# Patient Record
Sex: MALE | Race: BLACK OR AFRICAN AMERICAN | NOT HISPANIC OR LATINO | Employment: FULL TIME | URBAN - METROPOLITAN AREA
[De-identification: names, ages, dates, MRNs, and addresses within clinical notes are randomized per-mention and may not be internally consistent; named-entity substitution may affect disease eponyms.]

---

## 2019-04-17 PROBLEM — I45.6 WPW (WOLFF-PARKINSON-WHITE SYNDROME): Status: ACTIVE | Noted: 2019-04-17

## 2019-07-18 ENCOUNTER — TRANSCRIBE ORDERS (OUTPATIENT)
Dept: ADMINISTRATIVE | Facility: HOSPITAL | Age: 24
End: 2019-07-18

## 2019-07-18 DIAGNOSIS — R51.9 NONINTRACTABLE HEADACHE, UNSPECIFIED CHRONICITY PATTERN, UNSPECIFIED HEADACHE TYPE: Primary | ICD-10-CM

## 2019-07-29 ENCOUNTER — HOSPITAL ENCOUNTER (OUTPATIENT)
Dept: MRI IMAGING | Facility: HOSPITAL | Age: 24
Discharge: HOME | End: 2019-07-29
Admitting: NURSE PRACTITIONER

## 2019-07-29 DIAGNOSIS — R51.9 NONINTRACTABLE HEADACHE, UNSPECIFIED CHRONICITY PATTERN, UNSPECIFIED HEADACHE TYPE: ICD-10-CM

## 2019-07-29 PROCEDURE — 70544 MR ANGIOGRAPHY HEAD W/O DYE: CPT

## 2019-09-18 ENCOUNTER — CONSULT (OUTPATIENT)
Dept: CARDIOLOGY | Facility: CLINIC | Age: 24
End: 2019-09-18

## 2019-09-18 VITALS
SYSTOLIC BLOOD PRESSURE: 126 MMHG | HEIGHT: 71 IN | OXYGEN SATURATION: 95 % | HEART RATE: 72 BPM | DIASTOLIC BLOOD PRESSURE: 78 MMHG | BODY MASS INDEX: 29.26 KG/M2 | WEIGHT: 209 LBS

## 2019-09-18 DIAGNOSIS — I45.6 WPW (WOLFF-PARKINSON-WHITE SYNDROME): Primary | ICD-10-CM

## 2019-09-18 PROCEDURE — 93000 ELECTROCARDIOGRAM COMPLETE: CPT | Performed by: INTERNAL MEDICINE

## 2019-09-18 PROCEDURE — 99203 OFFICE O/P NEW LOW 30 MIN: CPT | Performed by: INTERNAL MEDICINE

## 2019-09-18 NOTE — PROGRESS NOTES
Electrophysiology Clinic Consult     Chandler Segal  1995  [unfilled]  [unfilled]    09/18/19    DATE OF ADMISSION: (Not on file)  [facility] CARDIOLOGY    Hamby, January, APRN  [address] [facility] [address] / [facility] [id number]    Chief Complaint   Patient presents with   • James-Parkinson-White Syndrome     Problem List:  1. WPW  a. Found on EKG prior to knee surgery, December 2009  b. No hx of tachycardia  2. Surgical history  a. Knee surgery      History of Present Illness:   Patient is a 23-year-old  male with above-noted past medical history who presents today in consultation by referral from January Hamby, APRN to reestablish care secondary to his history of WPW.  He was last seen by us in 2010.  He wishes to reestablish care as he is going to be coming off of his mother's insurance soon.  He has done well since we saw him last and denies any recent new complaints.  He has no history of SVT or symptoms of tachy-palpitations.  He is a non-smoker, 1 cup of coffee in the am, no energy drinks.  No thyroid problems.  Is active and denies any exertional complaints.  Works in sales.  On no medications at home.     No Known Allergies     Cannot display prior to admission medications because the patient has not been admitted in this contact.          No current outpatient medications on file.    Social History     Socioeconomic History   • Marital status:      Spouse name: Not on file   • Number of children: Not on file   • Years of education: Not on file   • Highest education level: Not on file   Tobacco Use   • Smoking status: Never Smoker   • Smokeless tobacco: Never Used   Substance and Sexual Activity   • Alcohol use: Yes     Comment: occas   • Drug use: No   • Sexual activity: Defer       Family History   Problem Relation Age of Onset   • No Known Problems Mother    • No Known Problems Father    • No Known Problems Sister    • No Known Problems Brother   "      REVIEW OF SYSTEMS:   CONST:  No weight loss, fever, chills, weakness or fatigue.   HEENT:  No visual loss, blurred vision, double vision, yellow sclerae.                   No hearing loss, congestion, sore throat.   SKIN:      No rashes, urticaria, ulcers, sores.     RESP:     No shortness of breath, hemoptysis, cough, sputum.   GI:           No anorexia, nausea, vomiting, diarrhea. No abdominal pain, melena.   :         No burning on urination, hematuria or increased frequency.  ENDO:    No diaphoresis, cold or heat intolerance. No polyuria or polydipsia.   NEURO:  No headache, dizziness, syncope, paralysis, ataxia, or parasthesias.                  No change in bowel or bladder control. No history of CVA/TIA  MUSC:    No muscle, back pain, joint pain or stiffness.   HEME:    No anemia, bleeding, bruising. No history of DVT/PE.  PSYCH:  No history of depression, anxiety    Vitals:    09/18/19 1454   BP: 126/78   BP Location: Right arm   Patient Position: Sitting   Pulse: 72   SpO2: 95%   Weight: 94.8 kg (209 lb)   Height: 180.3 cm (71\")                 Physical Exam:  GEN: Well nourished, well-developed, no acute distress  HEENT: Normocephalic, atraumatic, PERRLA, moist mucous membranes  NECK: Supple, NO JVD, no thyromegaly, no lymphadenopathy  CARD: S1S2, RRR, no murmur, gallop, rub, PMI NL  LUNGS: Clear to auscultation, normal respiratory effort  ABDOMEN: Soft, nontender, normal bowel sounds  EXTREMITIES: No gross deformities, no clubbing, cyanosis, or edema  SKIN: Warm, dry, no lesions  NEURO: No focal deficits, alert and oriented x 3  PSYCHIATRIC: Normal affect and mood      I personally viewed and interpreted the patient's EKG/Telemetry/lab data    No results found for: GLUCOSE, CALCIUM, NA, K, CO2, CL, BUN, CREATININE, EGFRIFAFRI, EGFRIFNONA, BCR, ANIONGAP  No results found for: WBC, HGB, HCT, MCV, PLT  No results found for: INR, PROTIME  No results found for: TSH, R1WQSDG, P2XGHUO, " THYROIDAB          ECG 12 Lead  Date/Time: 9/18/2019 3:05 PM  Performed by: Darek Church MD  Authorized by: Darek Church MD   Comparison: not compared with previous ECG   Previous ECG: no previous ECG available  Rhythm: sinus rhythm  BPM: 72                ICD-10-CM ICD-9-CM   1. WPW (James-Parkinson-White syndrome) I45.6 426.7       Assessment and Plan:   1. WPW:  - Patient with history of WPW diagnosed prior to his knee surgery in 2009.  He has had no history of SVT or symptoms of tachy-palpitations.  Clinically doing well with no new issues.  No evidence of WPW on today's EKG.  No changes to make today.  Instructed to call for any new onset tachy-palpitations, near syncope or syncope.     RTC 2 yrs    Scribed for Darek Church MD by Blanca Guerin, DWAYNE. 9/18/2019  3:05 PM     IDarek MD, personally performed the services described in this documentation as scribed by the above named individual in my presence, and it is both accurate and complete.  9/18/2019  3:46 PM

## 2022-01-20 LAB
ABO GROUP BLD: NORMAL
RH BLD: POSITIVE

## 2022-01-20 NOTE — PROGRESS NOTES
Please tell this patient's  that his blood type is O+ so his wife will need RhoGAM at 28 weeks.  Thank you.

## 2022-02-02 ENCOUNTER — TELEPHONE (OUTPATIENT)
Dept: OBSTETRICS AND GYNECOLOGY | Facility: CLINIC | Age: 27
End: 2022-02-02

## 2022-02-07 ENCOUNTER — TELEPHONE (OUTPATIENT)
Dept: OBSTETRICS AND GYNECOLOGY | Facility: CLINIC | Age: 27
End: 2022-02-07

## 2022-02-07 NOTE — TELEPHONE ENCOUNTER
[facility] is calling that they need the ICD code on the last labs done for this patient on 1/19. Please advise.    Thank you,  Petey

## 2022-07-20 ENCOUNTER — TELEPHONE (OUTPATIENT)
Dept: FAMILY MEDICINE CLINIC | Facility: CLINIC | Age: 27
End: 2022-07-20

## 2022-07-20 ENCOUNTER — OFFICE VISIT (OUTPATIENT)
Dept: FAMILY MEDICINE CLINIC | Facility: CLINIC | Age: 27
End: 2022-07-20

## 2022-07-20 VITALS
TEMPERATURE: 97.8 F | WEIGHT: 207 LBS | HEART RATE: 61 BPM | HEIGHT: 71 IN | BODY MASS INDEX: 28.98 KG/M2 | DIASTOLIC BLOOD PRESSURE: 75 MMHG | SYSTOLIC BLOOD PRESSURE: 130 MMHG

## 2022-07-20 DIAGNOSIS — R06.83 SNORING: ICD-10-CM

## 2022-07-20 DIAGNOSIS — Z00.00 ROUTINE GENERAL MEDICAL EXAMINATION AT A HEALTH CARE FACILITY: Primary | ICD-10-CM

## 2022-07-20 DIAGNOSIS — R53.83 FATIGUE, UNSPECIFIED TYPE: ICD-10-CM

## 2022-07-20 PROCEDURE — 99385 PREV VISIT NEW AGE 18-39: CPT | Performed by: NURSE PRACTITIONER

## 2022-07-20 NOTE — PROGRESS NOTES
"Chief Complaint  Establish Care (New pt, Physical )    Subjective        Chandler Segal presents to [facility] PRIMARY CARE  History of Present Illness     Chandler Segal is a 26-year-old male who presents today to establish care for physical.     The patient complains of frequent daytime fatigue. He reports feeling \"draggy\" upon waking up in the morning, which does improve throughout the day. He does snore and had previously been treated for sleep apnea with an oral appliance. However, he reports breaking the oral appliance and has since been using a bite guard for bruxism. He denies any jaw pain. The patient had a consultation with a sleep specialist last year but was lost to follow-up. He is not currently sleeping well through the night, as he has a 3-week-old son at home, but he reports his daytime fatigue began 6 months to 1 year prior to the birth of his son.     His job is stressful with long hours. Prior to the birth of his son, he had been exercising 3 days per week. He also admits he had been consuming a much healthier diet prior to the birth of his son.     The patient reports a history of James-Parkinson-White syndrome for which he follows with cardiology. He was last seen about 3 years ago where he had been told that he was doing well, and they would continue to monitor. He denies syncope, tachycardia, palpitations, or chest pain. He also denies obtaining any recent blood work. His blood pressure and heart rate are within normal limits today.     His current heatlh goals include becoming more physically fit and losing 5 to 10 pounds. The patient complains of upper back, neck, and shoulder pain and has been using a TENS unit per his chiropractor. He believes his back pain is due to a prior high school football injury. He previously had issues with tension headaches which have since resolved. He also notes that bruises seem to take longer to heal, which he attributes to aging. " "    The patient complains of occasional acid reflux triggered by his diet, usually when he eats pasta or pizza. He denies epigastric pain.      He denies any family history of breast cancer, ovarian cancer, colon cancer, or prostate cancer. He also denies a known family history of diabetes or heart disease.     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 He reports both alcohol consumption and marijuana use occasionally on weekends. He denies tobacco use.     Review of Systems   Constitutional: Positive for fatigue. Negative for appetite change and unexpected weight change.   HENT: Negative for congestion, ear pain, hearing loss, postnasal drip, rhinorrhea, sinus pressure, sore throat and trouble swallowing.         Positive for bruxism.    Eyes: Negative for visual disturbance.   Respiratory: Negative for apnea, cough, shortness of breath and wheezing.    Cardiovascular: Negative for chest pain, palpitations and leg swelling.   Gastrointestinal: Negative for abdominal pain, blood in stool, constipation, diarrhea, nausea and vomiting.        Positive for acid reflux   Endocrine: Negative for cold intolerance, heat intolerance, polydipsia, polyphagia and polyuria.   Genitourinary: Negative for difficulty urinating, hematuria, scrotal swelling and testicular pain.   Musculoskeletal: Positive for back pain (Upper back) and neck pain. Negative for arthralgias and joint swelling.   Skin: Negative for rash and wound.   Allergic/Immunologic: Negative for environmental allergies.   Neurological: Negative for dizziness and headaches.   Psychiatric/Behavioral: Positive for sleep disturbance. Negative for dysphoric mood. The patient is not nervous/anxious.        Objective   Vital Signs:  /75   Pulse 61   Temp 97.8 °F (36.6 °C)   Ht 180.3 cm (71\")   Wt 93.9 kg (207 lb)   BMI 28.87 kg/m²   Estimated body mass index is 28.87 kg/m² as calculated from the following:    Height as of this encounter: 180.3 cm (71\").   "  Weight as of this encounter: 93.9 kg (207 lb).    BMI is >= 25 and <30. (Overweight) The following options were offered after discussion;: weight loss educational material (shared in after visit summary)      Physical Exam  Vitals and nursing note reviewed.   Constitutional:       General: He is not in acute distress.     Appearance: He is well-developed. He is not ill-appearing.      Comments: Fit appearance, well dressed   HENT:      Head: Normocephalic and atraumatic.      Right Ear: Tympanic membrane, ear canal and external ear normal.      Left Ear: Tympanic membrane, ear canal and external ear normal.      Mouth/Throat:      Pharynx: Uvula midline.   Eyes:      General:         Right eye: No discharge.         Left eye: No discharge.      Conjunctiva/sclera: Conjunctivae normal.      Pupils: Pupils are equal, round, and reactive to light.   Neck:      Thyroid: No thyromegaly.   Cardiovascular:      Rate and Rhythm: Normal rate and regular rhythm.      Heart sounds: Normal heart sounds. No murmur heard.  Pulmonary:      Effort: Pulmonary effort is normal.      Breath sounds: Normal breath sounds.   Abdominal:      General: Bowel sounds are normal.      Palpations: Abdomen is soft.      Tenderness: There is no abdominal tenderness.   Musculoskeletal:         General: No deformity.      Cervical back: Neck supple.      Comments: Gait smooth and steady   Lymphadenopathy:      Cervical: No cervical adenopathy.   Skin:     General: Skin is warm and dry.   Neurological:      Mental Status: He is alert and oriented to person, place, and time.   Psychiatric:         Attention and Perception: Attention and perception normal.         Mood and Affect: Mood and affect normal.         Speech: Speech normal.         Behavior: Behavior normal. Behavior is cooperative.         Thought Content: Thought content normal.         Cognition and Memory: Cognition normal.      Comments: Very pleasant, conversant        Result Review  :                Assessment and Plan   Diagnoses and all orders for this visit:    1. Routine general medical examination at a health care facility (Primary)  -     CBC & Differential  -     Comprehensive Metabolic Panel  -     Lipid Panel With LDL / HDL Ratio  -     TSH Rfx On Abnormal To Free T4    2. Fatigue, unspecified type  -     CBC & Differential  -     Comprehensive Metabolic Panel  -     Lipid Panel With LDL / HDL Ratio  -     TSH Rfx On Abnormal To Free T4    3. Snoring      1. Health maintenance.  Appropriate health maintenance and prevention topics specific for this patient were discussed today.  Additionally, health goals, and health concerns addressed as appropriate.  Pt was encouraged to stay up to date on recommended screenings and vaccines based on USPSTF guidelines.     Routine fasting lab work will be obtained today. He can message me on frintit with any questions.      2. Fatigue.  He will research where he had previously been seen for sleep apnea, and we will request the records from them and determine next steps.        Follow Up   No follow-ups on file.  Patient was given instructions and counseling regarding his condition or for health maintenance advice. Please see specific information pulled into the AVS if appropriate.     Transcribed from ambient dictation for DWAYNE Hadley by DANIEL BERUMEN.  07/20/22   11:23 EDT    Patient verbalized consent to the visit recording.  I have personally performed the services described in this document as transcribed by the above individual, and it is both accurate and complete.  DWAYNE Hadley  7/20/2022  16:41 EDT

## 2022-07-21 LAB
ALBUMIN SERPL-MCNC: 4.9 G/DL (ref 4.1–5.2)
ALBUMIN/GLOB SERPL: 2 {RATIO} (ref 1.2–2.2)
ALP SERPL-CCNC: 86 IU/L (ref 44–121)
ALT SERPL-CCNC: 35 IU/L (ref 0–44)
AST SERPL-CCNC: 33 IU/L (ref 0–40)
BASOPHILS # BLD AUTO: 0 X10E3/UL (ref 0–0.2)
BASOPHILS NFR BLD AUTO: 1 %
BILIRUB SERPL-MCNC: 0.4 MG/DL (ref 0–1.2)
BUN SERPL-MCNC: 12 MG/DL (ref 6–20)
BUN/CREAT SERPL: 12 (ref 9–20)
CALCIUM SERPL-MCNC: 10.2 MG/DL (ref 8.7–10.2)
CHLORIDE SERPL-SCNC: 101 MMOL/L (ref 96–106)
CHOLEST SERPL-MCNC: 239 MG/DL (ref 100–199)
CO2 SERPL-SCNC: 23 MMOL/L (ref 20–29)
CREAT SERPL-MCNC: 1.01 MG/DL (ref 0.76–1.27)
EGFRCR SERPLBLD CKD-EPI 2021: 104 ML/MIN/1.73
EOSINOPHIL # BLD AUTO: 0.1 X10E3/UL (ref 0–0.4)
EOSINOPHIL NFR BLD AUTO: 1 %
ERYTHROCYTE [DISTWIDTH] IN BLOOD BY AUTOMATED COUNT: 12.1 % (ref 11.6–15.4)
GLOBULIN SER CALC-MCNC: 2.5 G/DL (ref 1.5–4.5)
GLUCOSE SERPL-MCNC: 97 MG/DL (ref 65–99)
HCT VFR BLD AUTO: 45 % (ref 37.5–51)
HDLC SERPL-MCNC: 46 MG/DL
HGB BLD-MCNC: 15.3 G/DL (ref 13–17.7)
IMM GRANULOCYTES # BLD AUTO: 0 X10E3/UL (ref 0–0.1)
IMM GRANULOCYTES NFR BLD AUTO: 0 %
LDLC SERPL CALC-MCNC: 150 MG/DL (ref 0–99)
LDLC/HDLC SERPL: 3.3 RATIO (ref 0–3.6)
LYMPHOCYTES # BLD AUTO: 2.2 X10E3/UL (ref 0.7–3.1)
LYMPHOCYTES NFR BLD AUTO: 38 %
MCH RBC QN AUTO: 29 PG (ref 26.6–33)
MCHC RBC AUTO-ENTMCNC: 34 G/DL (ref 31.5–35.7)
MCV RBC AUTO: 85 FL (ref 79–97)
MONOCYTES # BLD AUTO: 0.5 X10E3/UL (ref 0.1–0.9)
MONOCYTES NFR BLD AUTO: 9 %
NEUTROPHILS # BLD AUTO: 2.9 X10E3/UL (ref 1.4–7)
NEUTROPHILS NFR BLD AUTO: 51 %
PLATELET # BLD AUTO: 297 X10E3/UL (ref 150–450)
POTASSIUM SERPL-SCNC: 4.6 MMOL/L (ref 3.5–5.2)
PROT SERPL-MCNC: 7.4 G/DL (ref 6–8.5)
RBC # BLD AUTO: 5.28 X10E6/UL (ref 4.14–5.8)
SODIUM SERPL-SCNC: 139 MMOL/L (ref 134–144)
TRIGL SERPL-MCNC: 236 MG/DL (ref 0–149)
TSH SERPL DL<=0.005 MIU/L-ACNC: 0.81 UIU/ML (ref 0.45–4.5)
VLDLC SERPL CALC-MCNC: 43 MG/DL (ref 5–40)
WBC # BLD AUTO: 5.8 X10E3/UL (ref 3.4–10.8)

## 2022-07-26 ENCOUNTER — TELEPHONE (OUTPATIENT)
Dept: FAMILY MEDICINE CLINIC | Facility: CLINIC | Age: 27
End: 2022-07-26

## 2022-07-26 NOTE — TELEPHONE ENCOUNTER
PATIENT IS CALLING IN TO RETURN CALL TO [facility].      CALLBACK NUMBER IS  [phone]

## 2023-06-19 ENCOUNTER — OFFICE VISIT (OUTPATIENT)
Dept: INTERNAL MEDICINE | Facility: CLINIC | Age: 28
End: 2023-06-19
Payer: COMMERCIAL

## 2023-06-19 VITALS
BODY MASS INDEX: 28.39 KG/M2 | HEIGHT: 71 IN | WEIGHT: 202.8 LBS | DIASTOLIC BLOOD PRESSURE: 71 MMHG | HEART RATE: 65 BPM | SYSTOLIC BLOOD PRESSURE: 117 MMHG | OXYGEN SATURATION: 96 %

## 2023-06-19 DIAGNOSIS — R06.83 SNORING: ICD-10-CM

## 2023-06-19 DIAGNOSIS — Z83.49 FAMILY HISTORY OF HEMOCHROMATOSIS: ICD-10-CM

## 2023-06-19 DIAGNOSIS — Z00.00 ENCOUNTER FOR MEDICAL EXAMINATION TO ESTABLISH CARE: Primary | ICD-10-CM

## 2023-06-19 DIAGNOSIS — R06.81 WITNESSED EPISODE OF APNEA: ICD-10-CM

## 2023-06-19 DIAGNOSIS — E78.2 MIXED HYPERLIPIDEMIA: ICD-10-CM

## 2023-06-19 NOTE — PROGRESS NOTES
"Chief Complaint  Establish Care (/) and Labs Only (Genetic testing/iron check)    Subjective        Chandler Segal presents to [facility] PRIMARY CARE  History of Present Illness  27-year-old male presenting with family history of hemochromatosis to establish care.  Patient previously seen by DWAYNE Addison.  Mother has sarcoidosis and it was determined that she has heterozygous hemoglobin traits.  His uncles also have these traits.  Patient's mother and wife have been getting on him about getting tested.  Denies fatigue, abdominal pain, chest pain, headaches, difficulty breathing, shortness of breath.    Patient works for [employer].  He is  for the past 5 or 6 years.  Has a 1-year-old son named Slim.  Patient is very active exercising throughout the week.  Eats a generally healthy diet.  Does binge drink occasionally with friends.  Recent episode is making consider stopping this behavior.    Some family history of stroke, heart failure, breast and lung cancer.    Objective   Vital Signs:  /71 (BP Location: Left arm, Patient Position: Sitting, Cuff Size: Adult)   Pulse 65   Ht 180.3 cm (71\")   Wt 92 kg (202 lb 12.8 oz)   SpO2 96%   BMI 28.28 kg/m²   Estimated body mass index is 28.28 kg/m² as calculated from the following:    Height as of this encounter: 180.3 cm (71\").    Weight as of this encounter: 92 kg (202 lb 12.8 oz).             Physical Exam  Vitals reviewed.   Constitutional:       Appearance: Normal appearance.   Musculoskeletal:         General: Normal range of motion.      Cervical back: Normal range of motion.   Skin:     General: Skin is warm and dry.      Capillary Refill: Capillary refill takes less than 2 seconds.   Neurological:      General: No focal deficit present.      Mental Status: He is alert and oriented to person, place, and time.   Psychiatric:         Mood and Affect: Mood normal.         Behavior: Behavior normal.         " Thought Content: Thought content normal.         Judgment: Judgment normal.      Result Review :    Common labs          7/20/2022    09:58   Common Labs   Glucose 97    BUN 12    Creatinine 1.01    Sodium 139    Potassium 4.6    Chloride 101    Calcium 10.2    Total Protein 7.4    Albumin 4.9    Total Bilirubin 0.4    Alkaline Phosphatase 86    AST (SGOT) 33    ALT (SGPT) 35    WBC 5.8    Hemoglobin 15.3    Hematocrit 45.0    Platelets 297    Total Cholesterol 239    Triglycerides 236    HDL Cholesterol 46    LDL Cholesterol  150        No current outpatient medications on file prior to visit.     No current facility-administered medications on file prior to visit.                Assessment and Plan   Diagnoses and all orders for this visit:    1. Encounter for medical examination to establish care (Primary)    2. Mixed hyperlipidemia      Patient Instructions   Stay active. Stay hydrated with water. Recommend stretching and icing. Labs today. Referral placed for sleep medicine. Scheduling center to call with appointment. Wear sunscreen. Follow-up in 6 months for annual and fasting labs.            Follow Up   No follow-ups on file.  Patient was given instructions and counseling regarding his condition or for health maintenance advice. Please see specific information pulled into the AVS if appropriate.

## 2023-06-19 NOTE — PATIENT INSTRUCTIONS
Stay active. Stay hydrated with water. Recommend stretching and icing. Labs today. Referral placed for sleep medicine. Scheduling center to call with appointment. Wear sunscreen. Follow-up in 6 months for annual and fasting labs.

## 2023-06-23 LAB
ALBUMIN SERPL-MCNC: 4.9 G/DL (ref 4.1–5.2)
ALBUMIN/GLOB SERPL: 2.5 {RATIO} (ref 1.2–2.2)
ALP SERPL-CCNC: 81 IU/L (ref 44–121)
ALT SERPL-CCNC: 50 IU/L (ref 0–44)
AST SERPL-CCNC: 39 IU/L (ref 0–40)
BASOPHILS # BLD AUTO: 0 X10E3/UL (ref 0–0.2)
BASOPHILS NFR BLD AUTO: 1 %
BILIRUB SERPL-MCNC: 0.2 MG/DL (ref 0–1.2)
BUN SERPL-MCNC: 12 MG/DL (ref 6–20)
BUN/CREAT SERPL: 13 (ref 9–20)
CALCIUM SERPL-MCNC: 9.6 MG/DL (ref 8.7–10.2)
CHLORIDE SERPL-SCNC: 103 MMOL/L (ref 96–106)
CO2 SERPL-SCNC: 24 MMOL/L (ref 20–29)
CREAT SERPL-MCNC: 0.9 MG/DL (ref 0.76–1.27)
EGFRCR SERPLBLD CKD-EPI 2021: 119 ML/MIN/1.73
EOSINOPHIL # BLD AUTO: 0.1 X10E3/UL (ref 0–0.4)
EOSINOPHIL NFR BLD AUTO: 1 %
ERYTHROCYTE [DISTWIDTH] IN BLOOD BY AUTOMATED COUNT: 12.7 % (ref 11.6–15.4)
GLOBULIN SER CALC-MCNC: 2 G/DL (ref 1.5–4.5)
GLUCOSE SERPL-MCNC: 106 MG/DL (ref 70–99)
HCT VFR BLD AUTO: 42 % (ref 37.5–51)
HFE GENE MUT ANL BLD/T: NORMAL
HGB BLD-MCNC: 14.2 G/DL (ref 13–17.7)
IMM GRANULOCYTES # BLD AUTO: 0 X10E3/UL (ref 0–0.1)
IMM GRANULOCYTES NFR BLD AUTO: 0 %
IMP & REVIEW OF LAB RESULTS: NORMAL
IRON SATN MFR SERPL: 21 % (ref 15–55)
IRON SERPL-MCNC: 77 UG/DL (ref 38–169)
LYMPHOCYTES # BLD AUTO: 1.7 X10E3/UL (ref 0.7–3.1)
LYMPHOCYTES NFR BLD AUTO: 33 %
MCH RBC QN AUTO: 28.3 PG (ref 26.6–33)
MCHC RBC AUTO-ENTMCNC: 33.8 G/DL (ref 31.5–35.7)
MCV RBC AUTO: 84 FL (ref 79–97)
MONOCYTES # BLD AUTO: 0.5 X10E3/UL (ref 0.1–0.9)
MONOCYTES NFR BLD AUTO: 10 %
NEUTROPHILS # BLD AUTO: 2.7 X10E3/UL (ref 1.4–7)
NEUTROPHILS NFR BLD AUTO: 55 %
PLATELET # BLD AUTO: 305 X10E3/UL (ref 150–450)
POTASSIUM SERPL-SCNC: 4.6 MMOL/L (ref 3.5–5.2)
PROT SERPL-MCNC: 6.9 G/DL (ref 6–8.5)
RBC # BLD AUTO: 5.01 X10E6/UL (ref 4.14–5.8)
SODIUM SERPL-SCNC: 140 MMOL/L (ref 134–144)
TIBC SERPL-MCNC: 370 UG/DL (ref 250–450)
UIBC SERPL-MCNC: 293 UG/DL (ref 111–343)
WBC # BLD AUTO: 4.9 X10E3/UL (ref 3.4–10.8)

## 2023-06-23 NOTE — PROGRESS NOTES
Metabolic panel is unremarkable.  Slightly elevated liver enzyme present and slightly elevated nonfasting blood sugar present.  We will continue to monitor in the future.    CBC is within normal limits.  No signs of infection or anemia.    Iron levels within normal limits.  No signs of iron overload.    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  Individuals that are heterozygous are at same risk for iron overload as general population.  No preventative treatment recommended.

## 2023-09-13 ENCOUNTER — OFFICE VISIT (OUTPATIENT)
Dept: SLEEP MEDICINE | Facility: HOSPITAL | Age: 28
End: 2023-09-13
Payer: COMMERCIAL

## 2023-09-13 VITALS — BODY MASS INDEX: 27.44 KG/M2 | HEIGHT: 71 IN | HEART RATE: 52 BPM | WEIGHT: 196 LBS | OXYGEN SATURATION: 98 %

## 2023-09-13 DIAGNOSIS — E66.3 OVERWEIGHT WITH BODY MASS INDEX (BMI) 25.0-29.9: ICD-10-CM

## 2023-09-13 DIAGNOSIS — G47.30 SLEEP APNEA, UNSPECIFIED TYPE: Primary | ICD-10-CM

## 2023-09-13 DIAGNOSIS — G47.8 NON-RESTORATIVE SLEEP: ICD-10-CM

## 2023-09-13 DIAGNOSIS — R06.83 SNORING: ICD-10-CM

## 2023-09-13 DIAGNOSIS — G47.63 SLEEP-RELATED BRUXISM: ICD-10-CM

## 2023-09-13 DIAGNOSIS — G47.10 HYPERSOMNIA: ICD-10-CM

## 2023-09-13 PROCEDURE — G0463 HOSPITAL OUTPT CLINIC VISIT: HCPCS

## 2023-09-13 NOTE — PROGRESS NOTES
"[facility] Disorders Center New Patient/Consultation       Reason for Consultation: Snoring witnessed apneas      Patient Care Team:  Gerard Alejandro APRN as PCP - General (Nurse Practitioner)  Eunice Ureña MD as Consulting Physician (Sleep Medicine)      History of present illness:  Thank you for asking me to see your patient.  The patient is a 27 y.o. male with ADHD presents with concern for sleep disorder.  Sleep 6 to 8 hours 0-1 naps per day sleep latency 1 minute.  Patient reports snoring witnessed apneas hypersomnia nonrestorative sleep ESS of 7.  BMI 27.3.      Social History: No tobacco use 10 beers per day 1-2 times a week 1-2 coffees a day    Allergies:  Patient has no known allergies.    Family History: ROBYN no     No current outpatient medications on file.    Vital Signs:    Vitals:    09/13/23 1100   Pulse: 52   SpO2: 98%   Weight: 88.9 kg (196 lb)   Height: 180.3 cm (71\")      Body mass index is 27.34 kg/m².  Neck Circumference: 16 inches      REVIEW OF SYSTEMS.  Full review of systems available on the intake form which is scanned in the media tab.  The relevant positive are noted below  Daytime excessive sleepiness with [place] Sleepiness Scale :Total score: 11   Snoring  All negative      Physical exam:  Vitals:    09/13/23 1100   Pulse: 52   SpO2: 98%   Weight: 88.9 kg (196 lb)   Height: 180.3 cm (71\")    Body mass index is 27.34 kg/m². Neck Circumference: 16 inches  HEENT: Head is atraumatic, normocephalic  Eyes: pupils are round equal and reacting to light and accommodation, conjunctiva normal  Throat: tongue normal  NECK:Neck Circumference: 16 inches  RESPIRATORY SYSTEM: Regular respirations  CARDIOVASULAR SYSTEM: Regular rate  EXTREMITES: No cyanosis, clubbing  NEUROLOGICAL SYSTEM: Oriented x 3, no gross motor defects, gait normal      Impression:  1. Sleep apnea, unspecified type    2. Hypersomnia    3. Non-restorative sleep    4. Sleep-related bruxism    5. Snoring    6. Overweight with body mass " index (BMI) 25.0-29.9        Plan:    Good sleep hygiene measures should be maintained.  Weight loss would be beneficial in this patient who is over weight BMI 27.3.    I discussed the pathophysiology of obstructive sleep apnea with the patient.  We discussed the adverse outcomes associated with untreated sleep-disordered breathing.  We discussed treatment modalities of obstructive sleep apnea including CPAP device as well as oral mandibular advancement device. Sleep study will be scheduled to establish definitive diagnosis of sleep disorder breathing.  Weight loss will be strongly beneficial in order to reduce the severity of sleep-disordered breathing.  Caution during activities that require prolonged concentration is strongly advised.  Patient will be notified of sleep study results after sleep study is completed.  If sleep apnea is only mild,  oral mandibular advancement device may be one of the treatment options.  However if sleep apnea is moderately severe, CPAP treatment will be strongly encouraged.  The patient is not opposed to treatment with CPAP device if we confirm significant obstructive sleep apnea on polysomnography.     Thank you for allowing me to participate in your patient's care.    Eunice Ureña MD  Sleep Medicine  09/13/23  13:28 EDT

## 2023-12-29 ENCOUNTER — OFFICE VISIT (OUTPATIENT)
Dept: INTERNAL MEDICINE | Facility: CLINIC | Age: 28
End: 2023-12-29
Payer: COMMERCIAL

## 2023-12-29 VITALS
OXYGEN SATURATION: 98 % | SYSTOLIC BLOOD PRESSURE: 122 MMHG | HEART RATE: 87 BPM | HEIGHT: 71 IN | BODY MASS INDEX: 28.14 KG/M2 | DIASTOLIC BLOOD PRESSURE: 80 MMHG | WEIGHT: 201 LBS

## 2023-12-29 DIAGNOSIS — Z11.59 ENCOUNTER FOR HEPATITIS C SCREENING TEST FOR LOW RISK PATIENT: Primary | ICD-10-CM

## 2023-12-29 DIAGNOSIS — Z00.00 ANNUAL PHYSICAL EXAM: ICD-10-CM

## 2023-12-29 NOTE — PROGRESS NOTES
"Chief Complaint  Annual Exam    Subjective        Chandler Segal presents to [facility] PRIMARY [facility]ARE  History of Present Illness  28-year-old male presenting for annual exam.  Has a [id number] son named Slim.  Patient is sleeping well.  Saw sleep medicine for potential sleep apnea earlier this year.  Patient grinds teeth in his sleep occasionally.  States he is feeling well overall with his health.  Has been \"doing good things more and bad things less\".  Will be trying for a second child next year and afterwards considering vasectomy.  No other health concerns at this time.      Objective   Vital Signs:  /80 (BP Location: Right arm, Patient Position: Sitting, Cuff Size: Adult)   Pulse 87   Ht 180.3 cm (71\")   Wt 91.2 kg (201 lb)   SpO2 98%   BMI 28.03 kg/m²   Estimated body mass index is 28.03 kg/m² as calculated from the following:    Height as of this encounter: 180.3 cm (71\").    Weight as of this encounter: 91.2 kg (201 lb).       BMI is >= 25 and <30. (Overweight) The following options were offered after discussion;: exercise counseling/recommendations and nutrition counseling/recommendations      Physical Exam  Vitals reviewed.   Constitutional:       Appearance: Normal appearance.   HENT:      Head: Normocephalic.      Right Ear: Tympanic membrane normal.      Left Ear: Tympanic membrane normal.      Nose: Nose normal.      Mouth/Throat:      Mouth: Mucous membranes are moist.      Pharynx: Oropharynx is clear.   Eyes:      Pupils: Pupils are equal, round, and reactive to light.   Cardiovascular:      Rate and Rhythm: Normal rate.      Pulses: Normal pulses.      Heart sounds: Normal heart sounds.   Pulmonary:      Effort: Pulmonary effort is normal.      Breath sounds: Normal breath sounds.   Abdominal:      General: Bowel sounds are normal.      Palpations: Abdomen is soft.   Musculoskeletal:         General: Normal range of motion.      Cervical back: Normal range of " motion.   Skin:     General: Skin is warm and dry.      Capillary Refill: Capillary refill takes less than 2 seconds.   Neurological:      General: No focal deficit present.      Mental Status: He is alert and oriented to person, place, and time.   Psychiatric:         Mood and Affect: Mood normal.         Behavior: Behavior normal.         Thought Content: Thought content normal.         Judgment: Judgment normal.        Result Review :    Common labs          6/19/2023    15:27   Common Labs   Glucose 106    BUN 12    Creatinine 0.90    Sodium 140    Potassium 4.6    Chloride 103    Calcium 9.6    Total Protein 6.9    Albumin 4.9    Total Bilirubin 0.2    Alkaline Phosphatase 81    AST (SGOT) 39    ALT (SGPT) 50    WBC 4.9    Hemoglobin 14.2    Hematocrit 42.0    Platelets 305          No current outpatient medications on file prior to visit.     No current facility-administered medications on file prior to visit.              Assessment and Plan   Diagnoses and all orders for this visit:    1. Encounter for hepatitis C screening test for low risk patient (Primary)    2. Annual physical exam      Patient Instructions   Stay active. Stay hydrated with water.  Labs in the future. Follow-up in a year for annual and fasting labs.     The patient was counseled regarding nutrition, physical activity, healthy weight, injury prevention, misuse of tobacco, alcohol and illicit drugs, mental health, immunizations, and screenings.           Follow Up   No follow-ups on file.  Patient was given instructions and counseling regarding his condition or for health maintenance advice. Please see specific information pulled into the AVS if appropriate.

## 2023-12-29 NOTE — PATIENT INSTRUCTIONS
Stay active. Stay hydrated with water.  Labs in the future. Follow-up in a year for annual and fasting labs.

## 2024-06-18 ENCOUNTER — OFFICE VISIT (OUTPATIENT)
Dept: INTERNAL MEDICINE | Facility: CLINIC | Age: 29
End: 2024-06-18
Payer: COMMERCIAL

## 2024-06-18 VITALS
DIASTOLIC BLOOD PRESSURE: 70 MMHG | HEART RATE: 61 BPM | HEIGHT: 71 IN | SYSTOLIC BLOOD PRESSURE: 120 MMHG | TEMPERATURE: 98 F | OXYGEN SATURATION: 98 % | BODY MASS INDEX: 28.22 KG/M2 | WEIGHT: 201.6 LBS

## 2024-06-18 DIAGNOSIS — S80.811A ABRASION OF RIGHT LOWER EXTREMITY, INITIAL ENCOUNTER: Primary | ICD-10-CM

## 2024-06-18 PROCEDURE — 99213 OFFICE O/P EST LOW 20 MIN: CPT

## 2024-06-18 RX ORDER — CEPHALEXIN 500 MG/1
500 CAPSULE ORAL 2 TIMES DAILY
Qty: 14 CAPSULE | Refills: 0 | Status: SHIPPED | OUTPATIENT
Start: 2024-06-18 | End: 2024-06-25

## 2024-06-18 NOTE — PROGRESS NOTES
"Chief Complaint  Laceration (Right leg)    Subjective        Chandler Segal presents to [facility]  History of Present Illness  28-year-old male presenting with right leg abrasion.  On 6/15, scraped right shin on golfing park box and then [place].  Has been applying Neosporin daily.  Has increased pain in the leg and has noticed some redness around the perimeter of the wound.  Denies any purulent drainage or bleeding.  Denies fever.  Laceration         Objective   Vital Signs:  /70 (BP Location: Right arm, Patient Position: Sitting, Cuff Size: Large Adult)   Pulse 61   Temp 98 °F (36.7 °C)   Ht 180.3 cm (71\")   Wt 91.4 kg (201 lb 9.6 oz)   SpO2 98%   BMI 28.12 kg/m²   Estimated body mass index is 28.12 kg/m² as calculated from the following:    Height as of this encounter: 180.3 cm (71\").    Weight as of this encounter: 91.4 kg (201 lb 9.6 oz).               Physical Exam  Vitals reviewed.   Constitutional:       Appearance: Normal appearance.   Musculoskeletal:         General: Normal range of motion.      Cervical back: Normal range of motion.        Legs:    Skin:     General: Skin is warm and dry.      Capillary Refill: Capillary refill takes less than 2 seconds.   Neurological:      General: No focal deficit present.      Mental Status: He is alert and oriented to person, place, and time.   Psychiatric:         Mood and Affect: Mood normal.         Behavior: Behavior normal.         Thought Content: Thought content normal.         Judgment: Judgment normal.        Result Review :            No current outpatient medications on file prior to visit.     No current facility-administered medications on file prior to visit.                Assessment and Plan     Diagnoses and all orders for this visit:    1. Abrasion of right lower extremity, initial encounter (Primary)  -     cephalexin (Keflex) 500 MG capsule; Take 1 capsule by mouth 2 (Two) Times a Day for " 7 days.  Dispense: 14 capsule; Refill: 0        Patient Instructions   Take antibiotic as prescribed. Neosporin daily. Keep wound clean and dry. Monitor for worsening signs of infection. Stay hydrated with water. Follow-up as needed.            Follow Up     Return if symptoms worsen or fail to improve.  Patient was given instructions and counseling regarding his condition or for health maintenance advice. Please see specific information pulled into the AVS if appropriate.

## 2024-06-18 NOTE — PATIENT INSTRUCTIONS
Take antibiotic as prescribed. Neosporin daily. Keep wound clean and dry. Monitor for worsening signs of infection. Stay hydrated with water. Follow-up as needed.